# Patient Record
Sex: MALE | URBAN - METROPOLITAN AREA
[De-identification: names, ages, dates, MRNs, and addresses within clinical notes are randomized per-mention and may not be internally consistent; named-entity substitution may affect disease eponyms.]

---

## 2024-11-26 ENCOUNTER — APPOINTMENT (OUTPATIENT)
Dept: PRIMARY CARE | Facility: CLINIC | Age: 34
End: 2024-11-26

## 2025-01-31 ENCOUNTER — APPOINTMENT (OUTPATIENT)
Dept: PRIMARY CARE | Facility: CLINIC | Age: 35
End: 2025-01-31

## 2025-01-31 VITALS
HEART RATE: 61 BPM | SYSTOLIC BLOOD PRESSURE: 119 MMHG | DIASTOLIC BLOOD PRESSURE: 80 MMHG | WEIGHT: 134.6 LBS | BODY MASS INDEX: 21.12 KG/M2 | OXYGEN SATURATION: 99 % | HEIGHT: 67 IN

## 2025-01-31 DIAGNOSIS — K05.6 GINGIVAL AND PERIODONTAL DISEASE: ICD-10-CM

## 2025-01-31 DIAGNOSIS — K06.9 GINGIVAL AND PERIODONTAL DISEASE: ICD-10-CM

## 2025-01-31 DIAGNOSIS — Z00.00 ENCOUNTER FOR MEDICAL EXAMINATION TO ESTABLISH CARE: ICD-10-CM

## 2025-01-31 DIAGNOSIS — Z01.818 PREOPERATIVE EVALUATION TO RULE OUT SURGICAL CONTRAINDICATION: Primary | ICD-10-CM

## 2025-01-31 PROCEDURE — 99203 OFFICE O/P NEW LOW 30 MIN: CPT

## 2025-01-31 PROCEDURE — 3008F BODY MASS INDEX DOCD: CPT

## 2025-01-31 PROCEDURE — 99213 OFFICE O/P EST LOW 20 MIN: CPT | Mod: GE

## 2025-01-31 ASSESSMENT — PATIENT HEALTH QUESTIONNAIRE - PHQ9
2. FEELING DOWN, DEPRESSED OR HOPELESS: NOT AT ALL
SUM OF ALL RESPONSES TO PHQ9 QUESTIONS 1 AND 2: 0
1. LITTLE INTEREST OR PLEASURE IN DOING THINGS: NOT AT ALL

## 2025-01-31 ASSESSMENT — COLUMBIA-SUICIDE SEVERITY RATING SCALE - C-SSRS
6. HAVE YOU EVER DONE ANYTHING, STARTED TO DO ANYTHING, OR PREPARED TO DO ANYTHING TO END YOUR LIFE?: NO
2. HAVE YOU ACTUALLY HAD ANY THOUGHTS OF KILLING YOURSELF?: NO
1. IN THE PAST MONTH, HAVE YOU WISHED YOU WERE DEAD OR WISHED YOU COULD GO TO SLEEP AND NOT WAKE UP?: NO

## 2025-01-31 ASSESSMENT — PAIN SCALES - GENERAL: PAINLEVEL_OUTOF10: 0-NO PAIN

## 2025-01-31 NOTE — PROGRESS NOTES
"Subjective   Patient ID: Sergey May is a 35 y.o. male who presents for Establish Care (Blood work /)    HPI    Requesting lab work, seen dentist at [facility] for gingivitis that may require surgical extraction and osseous surgery under local anesthesia with epinephrine. Requested for full physical exam with comprehensive blood work (but not clarified if any specific lab is required). Not decided yet if he needs surgery.     Previous PCP: None     Moved from [facility] in 2022.     PAST MEDICAL HISTORY:  - recurrent gum issues, frequent gum bleeding when flossing (seeing periodontist)    PAST SURGICAL HISTORY:  - none    FAMILY HISTORY:  - dad: heart disease, testicular cancer  - mom: brain tumour, breast cancer   - 1 healthy sibling  - denies history of bleeding disorder    SOCIAL HISTORY:  Tobacco: ex-smoker, stopped in 2020   ETOH: 1-2 glass of wine once a week   Drug: denies   Sexual hx: not sexually active, last intercourse 6 months ago with female partner, uses condom consistently, no STI concern   Occupation: graduated from [employer], works in biomedical engineering field     ALLERGIES:  - NKDA     RECENT HOSPITALIZATIONS:  - no    RECENT PROCEDURES:  - no    Review of Systems  12 system ROS completed, negative except as noted above.      Objective   /80 (BP Location: Right arm, Patient Position: Sitting, BP Cuff Size: Adult)   Pulse 61   Ht 1.702 m (5' 7\")   Wt 61.1 kg (134 lb 9.6 oz)   BMI 21.08 kg/m²     Physical Exam    PHYSICAL EXAMINATION:   Gen: Appears well, NAD  HEENT: notable gum swelling  Chest: CTA  CVS: regular without murmur or gallop  Abd: soft, NT/ND, no bruits, no AAA  Ext: no edema, nontender  Skin: good condition without wounds or lesions  Neuro: grossly normal, CN intact, NAEEM x 4  Mood and affect appropriate      Assessment/Plan     35 year old Male with recurrent gingivitis presents to establish care and requesting preoperative lab for possible dental procedure. Clinically stable " otherwise. No other complaints.       Problem List Items Addressed This Visit    None  Visit Diagnoses         Codes    Preoperative evaluation to rule out surgical contraindication    -  Primary Z01.818    Relevant Orders    CBC    Comprehensive metabolic panel    Lipid panel    Protime-INR    Encounter for medical examination to establish care     Z00.00    Gingival and periodontal disease     K05.6, K06.9    Relevant Orders    CBC    Comprehensive metabolic panel    Protime-INR          RTC for physical exam     Discussed with Dr. Guy Trejo MD  Family Medicine PGY3

## 2025-02-13 NOTE — PROGRESS NOTES
I reviewed the resident/fellow's documentation and discussed the patient with the resident/fellow. I agree with the resident/fellow's medical decision making as documented in the note. I discussed but did not see the patient consistent with the Primary Care Exception.       Lissette Tovar MD

## 2025-04-18 LAB
ALBUMIN SERPL-MCNC: 4.5 G/DL (ref 3.6–5.1)
ALP SERPL-CCNC: 46 U/L (ref 36–130)
ALT SERPL-CCNC: 19 U/L (ref 9–46)
ANION GAP SERPL CALCULATED.4IONS-SCNC: 8 MMOL/L (CALC) (ref 7–17)
AST SERPL-CCNC: 26 U/L (ref 10–40)
BILIRUB SERPL-MCNC: 0.6 MG/DL (ref 0.2–1.2)
BUN SERPL-MCNC: 20 MG/DL (ref 7–25)
CALCIUM SERPL-MCNC: 9.3 MG/DL (ref 8.6–10.3)
CHLORIDE SERPL-SCNC: 105 MMOL/L (ref 98–110)
CHOLEST SERPL-MCNC: 180 MG/DL
CHOLEST/HDLC SERPL: 3.8 (CALC)
CO2 SERPL-SCNC: 28 MMOL/L (ref 20–32)
CREAT SERPL-MCNC: 0.69 MG/DL (ref 0.6–1.26)
EGFRCR SERPLBLD CKD-EPI 2021: 124 ML/MIN/1.73M2
ERYTHROCYTE [DISTWIDTH] IN BLOOD BY AUTOMATED COUNT: 12.5 % (ref 11–15)
GLUCOSE SERPL-MCNC: 78 MG/DL (ref 65–139)
HCT VFR BLD AUTO: 42.7 % (ref 38.5–50)
HDLC SERPL-MCNC: 48 MG/DL
HGB BLD-MCNC: 13.9 G/DL (ref 13.2–17.1)
INR PPP: 1
LDLC SERPL CALC-MCNC: 109 MG/DL (CALC)
MCH RBC QN AUTO: 28.2 PG (ref 27–33)
MCHC RBC AUTO-ENTMCNC: 32.6 G/DL (ref 32–36)
MCV RBC AUTO: 86.6 FL (ref 80–100)
NONHDLC SERPL-MCNC: 132 MG/DL (CALC)
PLATELET # BLD AUTO: 218 THOUSAND/UL (ref 140–400)
PMV BLD REES-ECKER: 10 FL (ref 7.5–12.5)
POTASSIUM SERPL-SCNC: 4.2 MMOL/L (ref 3.5–5.3)
PROT SERPL-MCNC: 7 G/DL (ref 6.1–8.1)
PROTHROMBIN TIME: 10.9 SEC (ref 9–11.5)
RBC # BLD AUTO: 4.93 MILLION/UL (ref 4.2–5.8)
SODIUM SERPL-SCNC: 141 MMOL/L (ref 135–146)
TRIGL SERPL-MCNC: 120 MG/DL
WBC # BLD AUTO: 5.4 THOUSAND/UL (ref 3.8–10.8)